# Patient Record
Sex: FEMALE | Race: WHITE
[De-identification: names, ages, dates, MRNs, and addresses within clinical notes are randomized per-mention and may not be internally consistent; named-entity substitution may affect disease eponyms.]

---

## 2018-05-18 ENCOUNTER — HOSPITAL ENCOUNTER (EMERGENCY)
Dept: HOSPITAL 17 - PHED | Age: 48
Discharge: HOME | End: 2018-05-18
Payer: SELF-PAY

## 2018-05-18 VITALS — HEART RATE: 104 BPM | RESPIRATION RATE: 18 BRPM | OXYGEN SATURATION: 96 %

## 2018-05-18 VITALS
SYSTOLIC BLOOD PRESSURE: 140 MMHG | HEART RATE: 114 BPM | OXYGEN SATURATION: 96 % | RESPIRATION RATE: 18 BRPM | TEMPERATURE: 99.3 F | DIASTOLIC BLOOD PRESSURE: 93 MMHG

## 2018-05-18 VITALS — WEIGHT: 207.9 LBS | BODY MASS INDEX: 36.84 KG/M2 | HEIGHT: 63 IN

## 2018-05-18 VITALS — RESPIRATION RATE: 18 BRPM

## 2018-05-18 VITALS — SYSTOLIC BLOOD PRESSURE: 126 MMHG | DIASTOLIC BLOOD PRESSURE: 76 MMHG

## 2018-05-18 DIAGNOSIS — S80.212A: ICD-10-CM
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
## 2018-05-18 DIAGNOSIS — S93.401A: Primary | ICD-10-CM
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
## 2018-05-18 PROCEDURE — E0113 CRUTCH UNDERARM EACH WOOD: HCPCS

## 2018-05-18 PROCEDURE — L1906 AFO MULTILIG ANK SUP PRE OTS: HCPCS

## 2018-05-18 PROCEDURE — 73564 X-RAY EXAM KNEE 4 OR MORE: CPT

## 2018-05-18 PROCEDURE — 99283 EMERGENCY DEPT VISIT LOW MDM: CPT

## 2018-05-18 PROCEDURE — 73610 X-RAY EXAM OF ANKLE: CPT

## 2018-05-18 NOTE — PD
HPI


Chief Complaint:  Injury


Time Seen by Provider:  02:45


Travel History


International Travel<30 days:  No


Contact w/Intl Traveler<30days:  No


Traveled to known affect area:  No





History of Present Illness


HPI


Patient is a 48-year-old female who presents the emergency room with complaints 

of left knee and right ankle pain.  Patient reports that she was walking her 

dog last night around 9 PM, reports that she tripped and fell and landed on her 

right ankle.  Patient denies any trauma to her head or neck, denies any loss of 

consciousness, reports that she currently is not on any anticoagulants.  

Patient reports that she was able to get up and ambulate after her fall, 

reports concerns as she woke up tonight with bruising to and swelling to her 

left knee and right ankle.





PFSH


Past Medical History


Cancer:  No


Cardiovascular Problems:  No


Endocrine:  No


Genitourinary:  No


Immune Disorder:  No


Musculoskeletal:  No


Neurologic:  No


Reproductive:  No


Respiratory:  No


Pregnant?:  Not Pregnant


LMP:  6 monthd ago





Past Surgical History


Pacemaker:  No





Social History


Alcohol Use:  No


Tobacco Use:  No


Substance Use:  No





Allergies-Medications


(Allergen,Severity, Reaction):  


Coded Allergies:  


     No Known Allergies (Verified  Adverse Reaction, Unknown, 5/18/18)


Reported Meds & Prescriptions





Reported Meds & Active Scripts


Active


Reported


Metoprolol Tartrate 100 Mg Tab 100 Mg PO BID


Symbicort Inh (Budesonide/Formoterol Fumarate) 80-4.5 Mcg/Act Aero 2 Puff INH 

Q12HR








Review of Systems


General / Constitutional:  No: Fever


Eyes:  No: Visual changes


HENT:  No: Headaches


Cardiovascular:  No: Chest Pain or Discomfort


Respiratory:  No: Shortness of Breath


Gastrointestinal:  No: Abdominal Pain


Genitourinary:  No: Dysuria


Musculoskeletal:  Positive: Limited ROM, Pain


Skin:  No Rash


Neurologic:  No: Weakness


Psychiatric:  No: Depression


Endocrine:  No: Polydipsia


Hematologic/Lymphatic:  No: Easy Bruising





Physical Exam


Narrative


GENERAL: Well-nourished, well-developed patient.


SKIN: Focused skin assessment warm/dry.


HEAD: Normocephalic.


EYES: No scleral icterus. No injection or drainage. 


NECK: Supple, trachea midline. No JVD or lymphadenopathy.


CARDIOVASCULAR: Regular rate and rhythm without murmurs, gallops, or rubs. 


RESPIRATORY: Breath sounds equal bilaterally. No accessory muscle use.


GASTROINTESTINAL: Abdomen soft, non-tender, nondistended. 


MUSCULOSKELETAL: No cyanosis,


Left lower extremity: Patient with normal range of motion to the left hip, left 

knee though there is pain with range of motion to left knee, left ankle, there 

is no open fracture


Right lower extremity: Patient with normal range of motion to right hip, right 

knee, there is obvious pain with range of motion to the right ankle, no open 

fracture


BACK: Nontender without obvious deformity. No CVA tenderness.





Data


Data


Last Documented VS





Vital Signs








  Date Time  Temp Pulse Resp B/P (MAP) Pulse Ox O2 Delivery O2 Flow Rate FiO2


 


5/18/18 03:07  104 18  96 Room Air  


 


5/18/18 02:36 99.3   140/93 (109)    








Orders





 Orders


Ankle, Complete (Hlh5nck) (5/18/18 )


Knee, Complete (4vws) (5/18/18 )


Ibuprofen (Motrin) (5/18/18 03:30)


Splint Or Brace Apply/Monitor (5/18/18 04:03)








MDM


Medical Decision Making


Medical Screen Exam Complete:  Yes


Emergency Medical Condition:  Yes


Medical Record Reviewed:  Yes


Interpretation(s)





Vital Signs








  Date Time  Temp Pulse Resp B/P (MAP) Pulse Ox O2 Delivery O2 Flow Rate FiO2


 


5/18/18 02:43  102   96 Room Air  


 


5/18/18 02:36 99.3 114 18 140/93 (109) 96   








Differential Diagnosis


fracture vs sprain


Narrative Course





Last Impressions








Knee X-Ray 5/18/18 0000 Signed





Impressions: 





 Service Date/Time:  Friday, May 18, 2018 03:02 - CONCLUSION:  Unremarkable 





 examination of the left knee.       Alexis Car Jr., MD 


 


Ankle X-Ray 5/18/18 0000 Signed





Impressions: 





 Service Date/Time:  Friday, May 18, 2018 03:02 - CONCLUSION:  Lateral soft 





 tissue swelling.     Alexis Car Jr., MD 





splint will be placed to right ankle, patient was given crutches, patient will 

follow-up with her primary care doctor and will return to the emergency room as 

needed.





Diagnosis





 Primary Impression:  


 Right ankle sprain


 Qualified Codes:  S93.401A - Sprain of unspecified ligament of right ankle, 

initial encounter


 Additional Impression:  


 Abrasion of knee, left


 Qualified Codes:  S80.212A - Abrasion, left knee, initial encounter


Patient Instructions:  General Instructions





***Additional Instructions:  


Please follow-up with orthopedic surgery as needed





Please follow-up with your primary care doctor





Return to the emergency room as needed


***Med/Other Pt SpecificInfo:  Prescription(s) given


Scripts


Ibuprofen (Ibuprofen) 600 Mg Tab


600 MG PO Q6H Y for Pain/Inflammation, #40 TAB 0 Refills


   Prov: Pilar Oshea DO         5/18/18


Disposition:  01 DISCHARGE HOME


Condition:  Stable











Pilar Oshea DO May 18, 2018 02:51

## 2018-05-18 NOTE — RADRPT
EXAM DATE/TIME:  05/18/2018 03:02 

 

[facility] COMPARISON:     

No previous studies available for comparison.

 

                     

INDICATIONS :     

Left knee pain post fall today

                     

 

MEDICAL HISTORY :     

None.          

 

SURGICAL HISTORY :     

None.   

 

ENCOUNTER:     

Initial                                        

 

ACUITY:     

1 day      

 

PAIN SCORE:     

5/10

 

LOCATION:     

Left  entire knee

 

FINDINGS:     

Four view examination of the left knee demonstrates no evidence of fracture or dislocation.  Bony min
eralization is normal.  The articular surfaces are intact.  The suprapatellar soft tissues have a nor
mal configuration.

 

CONCLUSION:     

Unremarkable examination of the left knee.  

 

 

 

 Alexis Car Jr., MD on May 18, 2018 at 3:55           

Board Certified Radiologist.

 This report was verified electronically.

## 2018-05-18 NOTE — RADRPT
EXAM DATE/TIME:  05/18/2018 03:02 

 

[facility] COMPARISON:     

No previous studies available for comparison.

 

                     

INDICATIONS :     

Right ankle pain post fall today

                     

 

MEDICAL HISTORY :     

None.          

 

SURGICAL HISTORY :     

None.   

 

ENCOUNTER:     

Initial                                        

 

ACUITY:     

1 day      

 

PAIN SCORE:     

10/10

 

LOCATION:     

Right  lateral malleolus and anterior surface of ankle

 

FINDINGS:     

Three view exam was performed of the right ankle.  The bony structures are in normal alignment.  No e
vidence of fracture or dislocation. Lateral soft tissue swelling. The ankle mortise is intact.  No ra
diopaque foreign bodies are seen.  Bony mineralization is normal.

 

CONCLUSION:     

Lateral soft tissue swelling.

 

 

 

 Alexis Car Jr., MD on May 18, 2018 at 3:54           

Board Certified Radiologist.

 This report was verified electronically.